# Patient Record
Sex: FEMALE | Race: WHITE | URBAN - METROPOLITAN AREA
[De-identification: names, ages, dates, MRNs, and addresses within clinical notes are randomized per-mention and may not be internally consistent; named-entity substitution may affect disease eponyms.]

---

## 2023-03-10 ENCOUNTER — TRANSCRIBE ORDERS (OUTPATIENT)
Dept: ADMINISTRATIVE | Age: 68
End: 2023-03-10

## 2023-03-10 DIAGNOSIS — R47.01 APHASIA: ICD-10-CM

## 2023-03-10 DIAGNOSIS — R41.3 MEMORY LOSS: Primary | ICD-10-CM

## 2023-03-23 ENCOUNTER — HOSPITAL ENCOUNTER (OUTPATIENT)
Dept: NEUROLOGY | Age: 68
Discharge: HOME | End: 2023-03-23
Payer: MEDICARE

## 2023-03-23 DIAGNOSIS — R47.01 APHASIA: ICD-10-CM

## 2023-03-23 DIAGNOSIS — R41.3 MEMORY LOSS: ICD-10-CM

## 2023-03-23 PROCEDURE — 95819 EEG AWAKE AND ASLEEP: CPT

## 2023-03-24 NOTE — PROCEDURES
Name: Andra Batch   : 1955   Interpreting Physician: Jaylyn Cohen MD   Referring Physician: Jesi Salinas MD   Date of EE2023      Clinical History:Memory loss    Current Antiepileptic Medications: No current facility-administered medications for this encounter. Technical Summary:  The EEG was recorded in a digital format on a patient who is reported to be awake and drowsy state during the recording. The patient was not sleep deprived prior to the EEG. The recording revealed abnormal background rhythm in the beta frequency range that was seen diffusely. Myogenic artifact noted throughout. The record was remarkable for the absence of epileptiform discharges. Photic stimulation was performed at various flash frequencies and without photoparoxysmal response. Hyperventilation was not performed due to medical condition. During the recording stage II sleep  was not seen. The EKG lead revealed no rhythm abnormalties. EEG Interpretation:   The EEG was abnormal due to the presence of: beta activity. No epileptiform activity. Myogenic artifact. Excessive beta activity is associated with the use of certain medications including benzodiazepines and barbiturates, and is otherwise of little diagnostic significance. No focal, lateralizing, or epileptiform features were seen during the recording. Clinical correlation is recommended.   The absence of epileptiform discharges on a single EEG does not rule out a diagnosis of  epilepsy or rule out non-convulsive or complex partial status epilepticus as a cause of altered mental status    Electronically signed by Jaylyn Cohen MD on 3/23/2023 at 9:01 PM

## 2023-03-30 ENCOUNTER — HOSPITAL ENCOUNTER (OUTPATIENT)
Dept: MRI IMAGING | Age: 68
Discharge: HOME | End: 2023-03-30
Payer: MEDICARE

## 2023-03-30 DIAGNOSIS — R41.3 MEMORY LOSS: ICD-10-CM

## 2023-03-30 DIAGNOSIS — R47.01 APHASIA: ICD-10-CM

## 2023-03-30 PROCEDURE — 72148 MRI LUMBAR SPINE W/O DYE: CPT

## 2025-06-05 ENCOUNTER — OFFICE VISIT (OUTPATIENT)
Dept: ORTHOPEDIC SURGERY | Age: 70
End: 2025-06-05
Payer: MEDICARE

## 2025-06-05 ENCOUNTER — HOSPITAL ENCOUNTER (OUTPATIENT)
Dept: GENERAL RADIOLOGY | Age: 70
Discharge: HOME | End: 2025-06-05
Payer: MEDICARE

## 2025-06-05 ENCOUNTER — TELEPHONE (OUTPATIENT)
Dept: ORTHOPEDIC SURGERY | Age: 70
End: 2025-06-05

## 2025-06-05 DIAGNOSIS — M16.12 OSTEOARTHRITIS OF LEFT HIP, UNSPECIFIED OSTEOARTHRITIS TYPE: Primary | ICD-10-CM

## 2025-06-05 DIAGNOSIS — M25.552 LEFT HIP PAIN: ICD-10-CM

## 2025-06-05 DIAGNOSIS — M25.551 RIGHT HIP PAIN: ICD-10-CM

## 2025-06-05 PROCEDURE — 99204 OFFICE O/P NEW MOD 45 MIN: CPT | Performed by: PHYSICIAN ASSISTANT

## 2025-06-05 PROCEDURE — 1159F MED LIST DOCD IN RCRD: CPT | Performed by: PHYSICIAN ASSISTANT

## 2025-06-05 PROCEDURE — 73522 X-RAY EXAM HIPS BI 3-4 VIEWS: CPT

## 2025-06-05 PROCEDURE — 1123F ACP DISCUSS/DSCN MKR DOCD: CPT | Performed by: PHYSICIAN ASSISTANT

## 2025-06-05 RX ORDER — PREDNISONE 10 MG/1
10 TABLET ORAL DAILY
Qty: 30 TABLET | Refills: 0
Start: 2025-06-05

## 2025-06-05 NOTE — PROGRESS NOTES
Date:  2025    Name:  Bruno Adames  Address:  [address]  [facility] [address]    :  1955      Age:   70 y.o.    SSN:  [id number]      Medical Record Number:  [id number]    Reason for Visit:    Chief Complaint    Hip Pain (New patient bilateral hip pain )      DOS:2025     HPI: Bruno Adames is a 70 y.o. female here today for evaluation of left hip pain.  Patient has dementia and is here with one of her caregivers.  She is a poor historian.  According to her caregiver patient has been progressively demonstrating more pain and weakness in her left hip.  She winces in describes a painful sensation in the left hip with any type of weightbearing.  Caregivers try to maintain her out of the wheelchair to continue her current mobility but she has persistently grown worse.      Pain Assessment  Location of Pain: Hip  Location Modifiers: Left, Right  Quality of Pain: Aching  Duration of Pain: Persistent  Frequency of Pain: Intermittent  Aggravating Factors: Walking  Limiting Behavior: Yes  Relieving Factors: Rest  Result of Injury: No  Work-Related Injury: No  Are there other pain locations you wish to document?: No  ROS: Review of systems reviewed from Patient History Form completed today and available in the patient's chart under the Media tab.       Past Medical History:   Diagnosis Date    Depression         Past Surgical History:   Procedure Laterality Date    BLADDER SUSPENSION      FOOT SURGERY      HYSTERECTOMY (CERVIX STATUS UNKNOWN)      WRIST GANGLION EXCISION         History reviewed. No pertinent family history.    Social History     Socioeconomic History    Marital status:      Spouse name: None    Number of children: None    Years of education: None    Highest education level: None   Tobacco Use    Smoking status: Former   Substance and Sexual Activity    Alcohol use: No       Current Outpatient Medications   Medication Sig Dispense Refill    predniSONE (DELTASONE) 10 MG

## 2025-06-05 NOTE — TELEPHONE ENCOUNTER
Medical Facility Question     Facility Name: [facility]  Contact Name: Giovanna  Contact Number: [phone]  Request or Information: Needing to know what kind of steroid taper patient is needing.     Fax#[phone]